# Patient Record
Sex: FEMALE | Race: WHITE
[De-identification: names, ages, dates, MRNs, and addresses within clinical notes are randomized per-mention and may not be internally consistent; named-entity substitution may affect disease eponyms.]

---

## 2019-02-26 ENCOUNTER — HOSPITAL ENCOUNTER (INPATIENT)
Dept: HOSPITAL 31 - C.EROB | Age: 29
LOS: 2 days | Discharge: HOME | End: 2019-02-28
Attending: OBSTETRICS & GYNECOLOGY | Admitting: OBSTETRICS & GYNECOLOGY
Payer: COMMERCIAL

## 2019-02-26 DIAGNOSIS — Z3A.40: ICD-10-CM

## 2019-02-26 LAB
ALBUMIN SERPL-MCNC: 4.2 G/DL (ref 3.5–5)
ALBUMIN/GLOB SERPL: 1.3 {RATIO} (ref 1–2.1)
ALT SERPL-CCNC: 10 U/L (ref 9–52)
AST SERPL-CCNC: 28 U/L (ref 14–36)
BASOPHILS # BLD AUTO: 0 K/UL (ref 0–0.2)
BASOPHILS NFR BLD: 0.1 % (ref 0–2)
BILIRUB UR-MCNC: NEGATIVE MG/DL
BUN SERPL-MCNC: 8 MG/DL (ref 7–17)
CALCIUM SERPL-MCNC: 9.3 MG/DL (ref 8.6–10.4)
EOSINOPHIL # BLD AUTO: 0 K/UL (ref 0–0.7)
EOSINOPHIL NFR BLD: 0.2 % (ref 0–4)
ERYTHROCYTE [DISTWIDTH] IN BLOOD BY AUTOMATED COUNT: 15.6 % (ref 11.5–14.5)
GFR NON-AFRICAN AMERICAN: > 60
GLUCOSE UR STRIP-MCNC: NORMAL MG/DL
HGB BLD-MCNC: 12.4 G/DL (ref 11–16)
LEUKOCYTE ESTERASE UR-ACNC: (no result) LEU/UL
LYMPHOCYTE: 11 % (ref 20–40)
LYMPHOCYTES # BLD AUTO: 1.4 K/UL (ref 1–4.3)
LYMPHOCYTES NFR BLD AUTO: 9.6 % (ref 20–40)
MCH RBC QN AUTO: 27.9 PG (ref 27–31)
MCHC RBC AUTO-ENTMCNC: 32.2 G/DL (ref 33–37)
MCV RBC AUTO: 86.7 FL (ref 81–99)
MONOCYTE: 5 % (ref 0–10)
MONOCYTES # BLD: 1 K/UL (ref 0–0.8)
MONOCYTES NFR BLD: 7 % (ref 0–10)
NEUTROPHILS # BLD: 11.8 K/UL (ref 1.8–7)
NEUTROPHILS NFR BLD AUTO: 83 % (ref 50–75)
NEUTROPHILS NFR BLD AUTO: 83.1 % (ref 50–75)
NEUTS BAND NFR BLD: 1 % (ref 0–2)
NRBC BLD AUTO-RTO: 0.1 % (ref 0–2)
PH UR STRIP: 6 [PH] (ref 5–8)
PLATELET # BLD EST: NORMAL 10*3/UL
PLATELET # BLD: 205 K/UL (ref 130–400)
PLATELET CLUMP BLD QL SMEAR: PRESENT
PMV BLD AUTO: 10.8 FL (ref 7.2–11.7)
PROT UR STRIP-MCNC: NEGATIVE MG/DL
RBC # BLD AUTO: 4.45 MIL/UL (ref 3.8–5.2)
RBC # UR STRIP: NEGATIVE /UL
SP GR UR STRIP: 1.01 (ref 1–1.03)
SQUAMOUS EPITHIAL: 2 /HPF (ref 0–5)
TOTAL CELLS COUNTED BLD: 100
UROBILINOGEN UR-MCNC: NORMAL MG/DL (ref 0.2–1)
WBC # BLD AUTO: 14.2 K/UL (ref 4.8–10.8)

## 2019-02-26 PROCEDURE — 0HQ9XZZ REPAIR PERINEUM SKIN, EXTERNAL APPROACH: ICD-10-PCS | Performed by: OBSTETRICS & GYNECOLOGY

## 2019-02-26 NOTE — OBADHP
===========================

Datetime: 2019 14:15

===========================

   

Vital Signs Provider:  Reviewed; Within Normal Limits

   

===========================

Datetime: 2019 11:20

===========================

   

Admit Comment, IP Provider:  Patient is a 27 y/o  female with single intrauterine pregnancy at 40
.1 weeks presenting for evaluation of contractions and associated pain. She began experiencing the co
ntractions and pain at 2:00 am this morning that have since become more frequent and intense. She rat
es her pain at 7.5/10 currently. Patient reports that she passed vaginal blood tinged jelly like subs
tance in the morning. She expresses that she continues to feel the baby moving. Patient denies headch
ae, nausea, vomitting, dysuria, and vaginal leakage of fluid/blood.

      

   OB/GYN Hx: , LMP 18, BECKY 19, denies STIs, last pap normal 

   Menstrual Hx: menarche 13yo, interval 28 days, duration 7 days, normal flow 

   PMHx: Denies

   PSHx: Denies:

   FMHx: Mother and Father alive, history of DM in family. 

   Medications: Prenatal vitamins

   Allergies: Shellfish

   Social: Patient denies tobacco use, patient drinks socially on weekends but has not had any alcoho
l since finding out she is pregnant, denies illicit drug use

      

   Vitals: reviewed, stable and WNL

   PE: As stated 

   FHM: reviewed, WNL

      

   A/P

   28 year old  female @40.1 wks

   Admit for expected vaginal delivery

   Labs

   ECFM/TOCO

   IVF

   NPO

   GBS status: f/u with Dr. Dyer

   Blood type: B+

   Encourage ambulation

      

   Discussed with Dr. Pino Florence PGY-1, Xavier Carmona OMS-III

      

   pt seen and examined

   admit for labor

   pain magnmetn

Pelvic Type - PN:  Adequate

Extremities - PN:  Normal

Abdomen - PN:  Normal

Back - PN:  Not Done

Breast - PN:  Not Done

Lungs - PN:  Normal

Heart - PN:  Normal

Thyroid - PN:  Normal

Neurologic - PN:  Normal

HEENT - PN:  Normal

General - PN:  Normal

Fetal Presentation-Admit:  Vertex

FHR - Baseline A Provider:  145

Membranes, Provider:  Intact

Contraction Comments Provider:  irregular

Comments, ACOG Physical Exam:  Gen: NAD

   Cardio: RRR, no MGR

   Pulm: CTA B/L

   Abd: soft, fundal height 36cm, non tender to palpation, striae, linea nigra

   : dilated 4cm, effacement 80%, station -2

   Ext: no edema, cyanosis

Gestation - Est Wks by US:  40.1

Pool Provider:  Negative

IP Prenatal Hx Assessment:  The Prenatal History has been Reviewed and is Current

IP Chief Complaint:  Uterine contractions; Maternal discomfort

NICHD Variability Prov Fetus A:  Moderate 6-25bpm

NICHD Accel Fetus A IP Provider:  [id number]

FHR Category Provider Fetus A:  Category I

NICHD Decel Fetus A IP Provider:  Early

Dilatation, Provider:  4

Effacement, Provider:  80

Station, Provider:  -2

Genitourinary Exam:  Normal

DTRs - PN:  Not Done

EGA AdmitDate IP:  40.1

IP Adm Impression:  Term, intrauterine pregnancy; Intact Membranes

IP Admit Plan:  Admit to unit; Initiate labor protocol

## 2019-02-26 NOTE — OBPN
===========================

Datetime: 2019 14:15

===========================

   

IP Progress Impression:  Normal progression of labor

Membranes, Provider:  Ruptured

Amniotic Fluid Color, Provider:  Meconium, Heavy

FHR - Baseline A Provider:  150

Gestation - Est Wks by US:  40.1

Fetal Presentation-Admit:  Vertex

IP Progress Note Comment:  pt seen and examined and noted ot have fluid noted on her perienum. Pt danya
aiend c/o ctx pain does not want pain medication.

      

   VS

   VE: 5/80/-2 SROM Meconium noted

   EFM: 150/mod franky

   TOCO: q 2-3 min

      

   A/P  @ 40 + wks in labor, meconium

   -pt counsled on meconium asprination syndrome, meconium

   -pt coulsend on progresion of labr

   -pain managment discussed with patient, declined

   -will reevalte pt

Vital Signs Provider:  Reviewed; Within Normal Limits

FHR Category Provider Fetus A:  Category I

NICHD Variability Prov Fetus A:  Moderate 6-25bpm

Dilatation, Provider:  5

Effacement, Provider:  80

Station, Provider:  -2

NICHD Decel Fetus A IP Provider:  None

   

===========================

Datetime: 2019 11:20

===========================

   

Pool Provider:  Negative

Contraction Comments Provider:  irregular

NICHD Accel Fetus A IP Provider:  [id number]

## 2019-02-26 NOTE — OBHP
===========================

Datetime: 2019 14:15

===========================

   

FHR - Baseline A Provider:  150

Vital Signs Provider:  Reviewed; Within Normal Limits

NICHD Variability Prov Fetus A:  Moderate 6-25bpm

Dilatation, Provider:  5

Effacement, Provider:  80

   

===========================

Datetime: 2019 11:20

===========================

   

IP Adm Impression:  Term, intrauterine pregnancy; Intact Membranes

IP Admit Plan:  Admit to unit; Initiate labor protocol

Admit Comment, IP Provider:  Patient is a 27 y/o  female with single intrauterine pregnancy at 40
.1 weeks presenting for evaluation of contractions and associated pain. She began experiencing the co
ntractions and pain at 2:00 am this morning that have since become more frequent and intense. She rat
es her pain at 7.5/10 currently. Patient reports that she passed vaginal blood tinged jelly like subs
tance in the morning. She expresses that she continues to feel the baby moving. Patient denies headch
ae, nausea, vomitting, dysuria, and vaginal leakage of fluid/blood.

      

   OB/GYN Hx: , LMP 18, BECKY 19, denies STIs, last pap normal 

   Menstrual Hx: menarche 13yo, interval 28 days, duration 7 days, normal flow 

   PMHx: Denies

   PSHx: Denies:

   FMHx: Mother and Father alive, history of DM in family. 

   Medications: Prenatal vitamins

   Allergies: Shellfish

   Social: Patient denies tobacco use, patient drinks socially on weekends but has not had any alcoho
l since finding out she is pregnant, denies illicit drug use

      

   Vitals: reviewed, stable and WNL

   PE: As stated 

   FHM: reviewed, WNL

      

   A/P

   28 year old  female @40.1 wks

   Admit for expected vaginal delivery

   Labs

   ECFM/TOCO

   IVF

   NPO

   GBS status: f/u with Dr. Dyer

   Blood type: B+

   Encourage ambulation

      

   Discussed with Dr. Pino Florence PGY-1, Xavier Carmona OMS-III

      

   pt seen and examined

   admit for labor

   pain magnmetn

Pelvic Type - PN:  Adequate

Extremities - PN:  Normal

Abdomen - PN:  Normal

Back - PN:  Not Done

Breast - PN:  Not Done

Lungs - PN:  Normal

Heart - PN:  Normal

Thyroid - PN:  Normal

Neurologic - PN:  Normal

HEENT - PN:  Normal

General - PN:  Normal

Fetal Presentation-Admit:  Vertex

Membranes, Provider:  Intact

Contraction Comments Provider:  irregular

Comments, ACOG Physical Exam:  Gen: NAD

   Cardio: RRR, no MGR

   Pulm: CTA B/L

   Abd: soft, fundal height 36cm, non tender to palpation, striae, linea nigra

   : dilated 4cm, effacement 80%, station -2

   Ext: no edema, cyanosis

Gestation - Est Wks by US:  40.1

Pool Provider:  Negative

IP Prenatal Hx Assessment:  The Prenatal History has been Reviewed and is Current

EGA AdmitDate IP:  40.1

IP Chief Complaint:  Uterine contractions; Maternal discomfort

NICHD Accel Fetus A IP Provider:  [id number]

FHR Category Provider Fetus A:  Category I

NICHD Decel Fetus A IP Provider:  Early

Station, Provider:  -2

Genitourinary Exam:  Normal

DTRs - PN:  Not Done

## 2019-02-26 NOTE — OBDS
===================================

DELIVERY PERSONNEL

===================================

   

Delivery Doctor:  Giselle Dyer MD

Circulator:  Renita Suarez RN

   

===================================

MATERNAL INFORMATION

===================================

   

Delivery Anesthesia:  Local

Estimated  Blood Loss (ml):  300

Placenta Cultured:  Yes

Maternal Complications:  None

RN Comments:  liveborn baby boy via nsd 

Provider Comments:  pt was fully dilated and pushing. Atruamtic, spontaneous delivery of head, nuchal
 cord x1 and on body. Cord loosened. Atruatmic, spontaneo delivery of atnerio followed by posteior sh
oulder followed by deliver of body. Both oral and nasal passages of the baby were bulb suctioned. umb
ilical cord was clamped and cut. baby handed to mother on abodmen with rn paul. cord blood and c
ord gases collected and sent x 2. Fundus Firm. Manual removal o fplacenta. Fundus firm, good hemsotai
s,periurethral laceration noted and repaired with 3-0 chormic. good hemostaiss, no complicaitns

      

   live male infant

   cephalic presntation

   agpars 9,9

   ebl 300ml

   

===================================

LABOR SUMMARY

===================================

   

EDC:  2019 00:00

No. Babies in Womb:  1

 Attempted:  No

Labor Anesthesia:  None

   

===================================

LABOR INFORMATION

===================================

   

Reason for Induction:  Not Applicable

Onset of Labor:  2019 02:00

Complete Dilatation:  2019 15:20

Oxytocin:  N/A

Group B Beta Strep:  Negative

Steroids Given:  None

Reason Steroids Not Administered:  Not Applicable

   

===================================

MEMBRANES

===================================

   

Membranes Rupture Method:  Spontaneous

Rupture of Membranes:  2019 13:30

Length of Rupture (hrs):  2.15

Amniotic Fluid Color:  Heavy Meconium

Amniotic Fluid Amount:  Moderate

Amniotic Fluid Odor:  None

   

===================================

STAGES OF LABOR

===================================

   

Stage 1 hrs:  13

Stage 1 min:  20

Stage 2 hrs:  0

Stage 2 min:  19

Stage 3 hrs:  0

Stage 3 min:  13

Total Time in Labor hrs:  13

Total Time in Labor min:  52

   

===================================

VAGINAL DELIVERY

===================================

   

Episiotomy:  None

Laceration Type:  Periurethral

Other Laceration:  periuretheral tear

Laceration Repair:  Yes

Initial Vag Sponge Count:  20

Final Vag Sponge Count:  20

Initial Vag Sharps Count:  1

Final Vag Sharps Count:  1

Sponge Count Correct:  Yes

Sharps Count Correct:  Yes

   

===================================

BABY A INFORMATION

===================================

   

Infant Delivery Date/Time:  2019 15:39

Method of Delivery:  Vaginal

Born in Route :  No

:  N/A

Forceps:  N/A

Vacuum Extraction:  N/A

Shoulder Dystocia :  No

   

===================================

SHOULDER DYSTOCIA BABY A

===================================

   

Infant Delivery Date/Time:  2019 15:39

   

===================================

PRESENTATION/POSITION BABY A

===================================

   

Presentation:  Cephalic

Cephalic Presentation:  Vertex

Vertex Position:  Right Occipital Anterior

Breech Presentation:  N/A

   

===================================

PLACENTA INFORMATION BABY A

===================================

   

Placenta Delivery Time :  2019 15:52

Placenta Method of Delivery:  Spontaneous

Placenta Status:  Delivered

   

===================================

APGAR SCORES BABY A

===================================

   

Heart Rate 1 min:  >100 bpm

Resp Effort 1 min:  Good Cry

Reflex Irritability 1 min:  Cough or Sneeze or Pulls Away

Muscle Tone 1 min:  Active Motion

Color 1 min:  Body Pink, Extremities Blue

Resuscitation Effort 1 min:  N/A

APGAR SCORE 1 MIN:  9

Heart Rate 5 min:  >100 bpm

Resp Effort 5 min:  Good Cry

Reflex Irritability 5 min:  Cough or Sneeze or Pulls Away

Muscle Tone 5 min:  Active Motion

Color 5 min:  Body Pink, Extremities Blue

Resuscitation Effort 5 min:  N/A

APGAR SCORE 5 MIN:  9

   

===================================

INFANT INFORMATION BABY A

===================================

   

Gestational Age at Delivery:  40.1

Gestational Status:  Term

Infant Outcome :  Liveborn

Infant Condition :  Stable

Infant Sex:  Male

   

===================================

IDENTIFICATION/MEDS BABY A

===================================

   

ID Band Number:  [id number]

ID Band Location:  Left Leg; Left Arm



Sensor Applied:  Yes

Sensor Number:  [id number]

Sensor Location :  Cord Clamp

Vitamin K Given :  Not Given

Erythromycin Given:  Not Given

   

===================================

WEIGHT/LENGTH BABY A

===================================

   

Infant Birthweight (gms):  3395

Infant Weight (lb):  7

Infant Weight (oz):  8

Infant Length Inches:  19.50

Infant Length cms:  49.5

   

===================================

CORD INFORMATION BABY A

===================================

   

Nuchal Cord :  Around Neck x1, Tight

Nuchal Cord Other:  around body x1 tight

Cord Blood Taken:  Yes

Infant Suction:  Mouth; Nose

   

===================================

ASSESSMENT BABY A

===================================

   

Infant Complications:  None

Physical Findings at Delivery:  Within Normal Limits

Infant Respirations:  Appears Normal

Neonatologist/ALS Called :  No

Infant Care By:  lina

Transferred To:   Nursery

## 2019-02-27 LAB
BASOPHILS # BLD AUTO: 0 K/UL (ref 0–0.2)
BASOPHILS # BLD AUTO: 0 K/UL (ref 0–0.2)
BASOPHILS NFR BLD: 0.2 % (ref 0–2)
BASOPHILS NFR BLD: 0.2 % (ref 0–2)
EOSINOPHIL # BLD AUTO: 0.1 K/UL (ref 0–0.7)
EOSINOPHIL # BLD AUTO: 0.2 K/UL (ref 0–0.7)
EOSINOPHIL NFR BLD: 0.3 % (ref 0–4)
EOSINOPHIL NFR BLD: 1.5 % (ref 0–4)
ERYTHROCYTE [DISTWIDTH] IN BLOOD BY AUTOMATED COUNT: 16.2 % (ref 11.5–14.5)
ERYTHROCYTE [DISTWIDTH] IN BLOOD BY AUTOMATED COUNT: 16.4 % (ref 11.5–14.5)
HGB BLD-MCNC: 12.4 G/DL (ref 11–16)
HGB BLD-MCNC: 12.6 G/DL (ref 11–16)
LYMPHOCYTES # BLD AUTO: 1.8 K/UL (ref 1–4.3)
LYMPHOCYTES # BLD AUTO: 2 K/UL (ref 1–4.3)
LYMPHOCYTES NFR BLD AUTO: 11.2 % (ref 20–40)
LYMPHOCYTES NFR BLD AUTO: 11.6 % (ref 20–40)
MCH RBC QN AUTO: 26.8 PG (ref 27–31)
MCH RBC QN AUTO: 27.9 PG (ref 27–31)
MCHC RBC AUTO-ENTMCNC: 31.6 G/DL (ref 33–37)
MCHC RBC AUTO-ENTMCNC: 32.4 G/DL (ref 33–37)
MCV RBC AUTO: 84.9 FL (ref 81–99)
MCV RBC AUTO: 86 FL (ref 81–99)
MONOCYTES # BLD: 1 K/UL (ref 0–0.8)
MONOCYTES # BLD: 1.2 K/UL (ref 0–0.8)
MONOCYTES NFR BLD: 6.3 % (ref 0–10)
MONOCYTES NFR BLD: 6.9 % (ref 0–10)
NEUTROPHILS # BLD: 12.4 K/UL (ref 1.8–7)
NEUTROPHILS # BLD: 14.2 K/UL (ref 1.8–7)
NEUTROPHILS NFR BLD AUTO: 80.4 % (ref 50–75)
NEUTROPHILS NFR BLD AUTO: 81.4 % (ref 50–75)
NRBC BLD AUTO-RTO: 0.1 % (ref 0–2)
NRBC BLD AUTO-RTO: 0.1 % (ref 0–2)
PLATELET # BLD: 187 K/UL (ref 130–400)
PLATELET # BLD: 195 K/UL (ref 130–400)
PMV BLD AUTO: 10.4 FL (ref 7.2–11.7)
PMV BLD AUTO: 9.7 FL (ref 7.2–11.7)
RBC # BLD AUTO: 4.53 MIL/UL (ref 3.8–5.2)
RBC # BLD AUTO: 4.62 MIL/UL (ref 3.8–5.2)
WBC # BLD AUTO: 15.4 K/UL (ref 4.8–10.8)
WBC # BLD AUTO: 17.4 K/UL (ref 4.8–10.8)

## 2019-02-27 RX ADMIN — Medication SCH TAB: at 09:57

## 2019-02-28 VITALS
SYSTOLIC BLOOD PRESSURE: 118 MMHG | OXYGEN SATURATION: 98 % | TEMPERATURE: 97.6 F | DIASTOLIC BLOOD PRESSURE: 79 MMHG | HEART RATE: 96 BPM

## 2019-02-28 VITALS — RESPIRATION RATE: 18 BRPM

## 2019-02-28 LAB
BASOPHILS # BLD AUTO: 0.1 K/UL (ref 0–0.2)
BASOPHILS NFR BLD: 0.4 % (ref 0–2)
EOSINOPHIL # BLD AUTO: 0.4 K/UL (ref 0–0.7)
EOSINOPHIL NFR BLD: 3.1 % (ref 0–4)
ERYTHROCYTE [DISTWIDTH] IN BLOOD BY AUTOMATED COUNT: 16.5 % (ref 11.5–14.5)
HGB BLD-MCNC: 11.9 G/DL (ref 11–16)
LYMPHOCYTES # BLD AUTO: 2 K/UL (ref 1–4.3)
LYMPHOCYTES NFR BLD AUTO: 14.6 % (ref 20–40)
MCH RBC QN AUTO: 27.3 PG (ref 27–31)
MCHC RBC AUTO-ENTMCNC: 31.6 G/DL (ref 33–37)
MCV RBC AUTO: 86.3 FL (ref 81–99)
MONOCYTES # BLD: 0.8 K/UL (ref 0–0.8)
MONOCYTES NFR BLD: 6 % (ref 0–10)
NEUTROPHILS # BLD: 10.2 K/UL (ref 1.8–7)
NEUTROPHILS NFR BLD AUTO: 75.9 % (ref 50–75)
NRBC BLD AUTO-RTO: 0 % (ref 0–2)
PLATELET # BLD: 207 K/UL (ref 130–400)
PMV BLD AUTO: 9.7 FL (ref 7.2–11.7)
RBC # BLD AUTO: 4.38 MIL/UL (ref 3.8–5.2)
WBC # BLD AUTO: 13.5 K/UL (ref 4.8–10.8)

## 2019-02-28 RX ADMIN — Medication SCH TAB: at 09:21

## 2019-02-28 NOTE — OBPPN
===========================

Datetime: 2019 11:31

===========================

   

PP Pain Prov:  Within normal limits

PP Nausea Prov:  Denies

PP Flatus Prov:  Yes

PP Breasts Prov:  Normal

PP Heart Prov:  Normal

PP Lungs Prov:  Normal

PP Abdomen/Uterus Prov:  Normal

PP Lochia Prov:  Normal

PP Vulva/Perineum Prov:  Normal

PP CVA Tenderness Prov:  Normal

PP Extremities Prov:  Normal

PP Plan Prov:  Discharge

   

===========================

Datetime: 2019 08:48

===========================

   

PP BM Prov:  No

PP Comments Phys Exam Prov:  Cardiac: RRR no murmurs, gallops, or rubs

   Pulmonary exam:  CTA b/l no W/R/R

   Abdominal exam: Soft, no tenderness to palpation, no rebound tenderness

   LE: no peripheral edema present b/l

PP Impression Prov:  Normal postpartum progression

PP Progress Note Prov:  Patient is a 27 y/o ,0,0,1 female that is status post  day 1 that was 
examined at bedside in the am. Patient was resting comfortably in bed. She currently feels sore and t
hat she has been getting decent sleep. She comments that she has been bleeding amoderate amount, stat
ing that she has had to change pads 3 times since the delivery on  and that each time she uses 3 
pads. She elaborates that she feels like her b;leeding has progessively been decreasing. She has been
 able to urinate normally but has been unable to pass stool. she is unaware of whether she has passed
 gas yet or not. She explains that she intends to breast feed exclusively. She denies headaches, naus
ea, vomitting, and diahrrea. She comments that she did feels some shakes and sweating post delivery b
ut it went away quickly soon after. 

      

   Vitals:

   97F, 95 BPM, 122/69 mmHg, 20RPM, 98%

      

   Assesment:

   Patient is a 27 y/o ,0,0,1 female that is status post  day 1

      

   Plan:

   Continue present managment

   Encourage ambulation within the limitations of what the patient can handle

   Contine pain medictions as needed

   Continue LR

   Continue ancef 

      

   agree iwth above

   pain amgnet

   am cbc

   e ncoaurg brat feeding

      

Vital Signs Provider PP:  Reviewed; Within Normal Limits

## 2019-02-28 NOTE — OBDCSUM
===========================

Datetime: 02/28/2019 11:31

===========================

   

Discharged to, Provider:  Home

Follow up at, Provider:  dr george

Disch Instr Activity:  Normal activity

Disch Instr Diet:  Regular

Discharge Instructions, Provider:  Routine instructions given

Discharge Diagnosis, Provider:  Term Pregnancy Delivered

Discharge Time:  02/28/2019 11:31

Follow up in weeks, Provider:  6 weeks

Disch Referrals:  None

Contraception discussed, Prov:  Yes

Disch Activity Restrictions:  No sexual activity; Nothing in vagina - [place], tampons, douche

Contraception after Delivery:  Not Planning to Use